# Patient Record
Sex: MALE | Race: WHITE | NOT HISPANIC OR LATINO | Employment: OTHER | URBAN - METROPOLITAN AREA
[De-identification: names, ages, dates, MRNs, and addresses within clinical notes are randomized per-mention and may not be internally consistent; named-entity substitution may affect disease eponyms.]

---

## 2017-11-21 ENCOUNTER — HISTORICAL (OUTPATIENT)
Dept: RADIOLOGY | Facility: HOSPITAL | Age: 82
End: 2017-11-21

## 2017-12-18 ENCOUNTER — HISTORICAL (OUTPATIENT)
Dept: RADIOLOGY | Facility: HOSPITAL | Age: 82
End: 2017-12-18

## 2019-07-25 ENCOUNTER — HISTORICAL (OUTPATIENT)
Dept: RADIOLOGY | Facility: HOSPITAL | Age: 84
End: 2019-07-25

## 2020-02-07 ENCOUNTER — HISTORICAL (OUTPATIENT)
Dept: RADIOLOGY | Facility: HOSPITAL | Age: 85
End: 2020-02-07

## 2020-10-13 ENCOUNTER — HISTORICAL (OUTPATIENT)
Dept: RESPIRATORY THERAPY | Facility: HOSPITAL | Age: 85
End: 2020-10-13

## 2020-11-02 ENCOUNTER — HISTORICAL (OUTPATIENT)
Dept: NUTRITION | Facility: HOSPITAL | Age: 85
End: 2020-11-02

## 2020-11-03 LAB
ABS NEUT (OLG): 7.19 X10(3)/MCL (ref 2.1–9.2)
ALBUMIN SERPL-MCNC: 3.2 GM/DL (ref 3.4–5)
ALBUMIN/GLOB SERPL: 0.9 RATIO (ref 1.1–2)
ALP SERPL-CCNC: 71 UNIT/L (ref 40–150)
ALT SERPL-CCNC: 27 UNIT/L (ref 0–55)
AST SERPL-CCNC: 24 UNIT/L (ref 5–34)
BASOPHILS # BLD AUTO: 0.1 X10(3)/MCL (ref 0–0.2)
BASOPHILS NFR BLD AUTO: 1 %
BILIRUB SERPL-MCNC: 0.5 MG/DL
BILIRUBIN DIRECT+TOT PNL SERPL-MCNC: 0.2 MG/DL (ref 0–0.5)
BILIRUBIN DIRECT+TOT PNL SERPL-MCNC: 0.3 MG/DL (ref 0–0.8)
BUN SERPL-MCNC: 24.6 MG/DL (ref 8.4–25.7)
CALCIUM SERPL-MCNC: 8.5 MG/DL (ref 8.8–10)
CHLORIDE SERPL-SCNC: 101 MMOL/L (ref 98–111)
CO2 SERPL-SCNC: 27 MMOL/L (ref 23–31)
CREAT SERPL-MCNC: 1.05 MG/DL (ref 0.73–1.18)
EOSINOPHIL # BLD AUTO: 0.2 X10(3)/MCL (ref 0–0.9)
EOSINOPHIL NFR BLD AUTO: 2 %
ERYTHROCYTE [DISTWIDTH] IN BLOOD BY AUTOMATED COUNT: 14.3 % (ref 11.5–17)
GLOBULIN SER-MCNC: 3.7 GM/DL (ref 2.4–3.5)
GLUCOSE SERPL-MCNC: 152 MG/DL (ref 75–121)
HCT VFR BLD AUTO: 35.1 % (ref 42–52)
HGB BLD-MCNC: 10.7 GM/DL (ref 14–18)
LYMPHOCYTES # BLD AUTO: 1.1 X10(3)/MCL (ref 0.6–4.6)
LYMPHOCYTES NFR BLD AUTO: 12 %
MCH RBC QN AUTO: 30.1 PG (ref 27–31)
MCHC RBC AUTO-ENTMCNC: 30.5 GM/DL (ref 33–36)
MCV RBC AUTO: 98.9 FL (ref 80–94)
MONOCYTES # BLD AUTO: 0.8 X10(3)/MCL (ref 0.1–1.3)
MONOCYTES NFR BLD AUTO: 9 %
NEUTROPHILS # BLD AUTO: 7.19 X10(3)/MCL (ref 2.1–9.2)
NEUTROPHILS NFR BLD AUTO: 76 %
PLATELET # BLD AUTO: 162 X10(3)/MCL (ref 130–400)
PMV BLD AUTO: 11.1 FL (ref 9.4–12.4)
POTASSIUM SERPL-SCNC: 4.5 MMOL/L (ref 3.5–5.1)
PROT SERPL-MCNC: 6.9 GM/DL (ref 5.8–7.6)
RBC # BLD AUTO: 3.55 X10(6)/MCL (ref 4.7–6.1)
SODIUM SERPL-SCNC: 140 MMOL/L (ref 132–146)
WBC # SPEC AUTO: 9.4 X10(3)/MCL (ref 4.5–11.5)

## 2020-11-26 LAB — EST CREAT CLEARANCE SER (OHS): 22.04 ML/MIN

## 2021-06-11 ENCOUNTER — HISTORICAL (OUTPATIENT)
Dept: RADIOLOGY | Facility: HOSPITAL | Age: 86
End: 2021-06-11

## 2021-12-20 ENCOUNTER — HISTORICAL (OUTPATIENT)
Dept: RADIOLOGY | Facility: HOSPITAL | Age: 86
End: 2021-12-20

## 2022-04-10 ENCOUNTER — HISTORICAL (OUTPATIENT)
Dept: ADMINISTRATIVE | Facility: HOSPITAL | Age: 87
End: 2022-04-10

## 2022-04-29 VITALS
DIASTOLIC BLOOD PRESSURE: 62 MMHG | SYSTOLIC BLOOD PRESSURE: 122 MMHG | HEIGHT: 65 IN | WEIGHT: 216.5 LBS | BODY MASS INDEX: 36.07 KG/M2

## 2023-01-17 ENCOUNTER — LAB VISIT (OUTPATIENT)
Dept: LAB | Facility: HOSPITAL | Age: 88
End: 2023-01-17
Attending: INTERNAL MEDICINE
Payer: MEDICARE

## 2023-01-17 DIAGNOSIS — R05.9 COUGH, UNSPECIFIED TYPE: ICD-10-CM

## 2023-01-17 DIAGNOSIS — R50.9 FEVER, UNSPECIFIED FEVER CAUSE: Primary | ICD-10-CM

## 2023-01-17 DIAGNOSIS — R09.81 NASAL CONGESTION: ICD-10-CM

## 2023-01-17 LAB
FLUAV AG UPPER RESP QL IA.RAPID: NOT DETECTED
FLUBV AG UPPER RESP QL IA.RAPID: NOT DETECTED
RSV A 5' UTR RNA NPH QL NAA+PROBE: NOT DETECTED
SARS-COV-2 RNA RESP QL NAA+PROBE: DETECTED

## 2023-01-17 PROCEDURE — 0241U COVID/RSV/FLU A&B PCR: CPT

## 2024-06-14 ENCOUNTER — HOSPITAL ENCOUNTER (OUTPATIENT)
Dept: RADIOLOGY | Facility: HOSPITAL | Age: 89
Discharge: HOME | End: 2024-06-14
Payer: MEDICARE

## 2024-06-14 DIAGNOSIS — R05.9 COUGH, UNSPECIFIED: ICD-10-CM

## 2024-06-14 PROCEDURE — 71046 X-RAY EXAM CHEST 2 VIEWS: CPT | Mod: TC

## 2025-04-22 ENCOUNTER — HOSPITAL ENCOUNTER (EMERGENCY)
Facility: HOSPITAL | Age: OVER 89
Discharge: HOME | End: 2025-04-22
Attending: EMERGENCY MEDICINE
Payer: MEDICARE

## 2025-04-22 VITALS
WEIGHT: 198 LBS | BODY MASS INDEX: 32.99 KG/M2 | TEMPERATURE: 98 F | HEIGHT: 65 IN | HEART RATE: 71 BPM | OXYGEN SATURATION: 98 % | RESPIRATION RATE: 16 BRPM | SYSTOLIC BLOOD PRESSURE: 148 MMHG | DIASTOLIC BLOOD PRESSURE: 69 MMHG

## 2025-04-22 DIAGNOSIS — S09.90XA INJURY OF HEAD, INITIAL ENCOUNTER: ICD-10-CM

## 2025-04-22 DIAGNOSIS — S01.01XA LACERATION OF SCALP, INITIAL ENCOUNTER: ICD-10-CM
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
## 2025-04-22 PROCEDURE — 25000003 PHARM REV CODE 250: Performed by: NURSE PRACTITIONER

## 2025-04-22 PROCEDURE — 99284 EMERGENCY DEPT VISIT MOD MDM: CPT | Mod: 25

## 2025-04-22 PROCEDURE — 12002 RPR S/N/AX/GEN/TRNK2.6-7.5CM: CPT

## 2025-04-22 RX ORDER — LIDOCAINE AND PRILOCAINE 25; 25 MG/G; MG/G
CREAM TOPICAL
Status: COMPLETED | OUTPATIENT
Start: 2025-04-22 | End: 2025-04-22

## 2025-04-22 RX ADMIN — LIDOCAINE AND PRILOCAINE: 25; 25 CREAM TOPICAL at 08:04

## 2025-04-23 NOTE — DISCHARGE INSTRUCTIONS
Clean with soap and water. Tylenol for pain if needed. Return to ER in 5-7 days to have staples removed.

## 2025-04-23 NOTE — ED PROVIDER NOTES
Encounter Date: 4/22/2025       History     Chief Complaint   Patient presents with    Fall     Pt states he fell out of the shower and hit his head on the floor. Laceration noted to center forehead with bleeding controlled. Denies pain, blood thinners, or LOC. GCS 15      See MDM    The history is provided by the patient. No  was used.     Review of patient's allergies indicates:   Allergen Reactions    Plavix [clopidogrel]      Past Medical History:   Diagnosis Date    Diabetes mellitus      Past Surgical History:   Procedure Laterality Date    INSERTION OF PACEMAKER       No family history on file.  Social History[1]  Review of Systems   Skin:  Positive for wound.   All other systems reviewed and are negative.      Physical Exam     Initial Vitals [04/22/25 2030]   BP Pulse Resp Temp SpO2   (!) 151/98 73 18 98 °F (36.7 °C) 96 %      MAP       --         Physical Exam    Nursing note and vitals reviewed.  Constitutional: He appears well-developed and well-nourished.   HENT:   Head: Head is with laceration.       3cm laceration to top of head near forehead    Cardiovascular:  Normal rate.           Pulmonary/Chest: No respiratory distress.     Neurological: He is alert.   Skin: Skin is warm and dry.   Psychiatric: He has a normal mood and affect.         ED Course   Lac Repair    Date/Time: 4/22/2025 9:26 PM    Performed by: Renea Madison FNP  Authorized by: Lazaro Foster DO    Consent:     Consent obtained:  Verbal    Consent given by:  Patient    Risks discussed:  Need for additional repair and infection    Alternatives discussed:  No treatment  Universal protocol:     Procedure explained and questions answered to patient or proxy's satisfaction: yes      Patient identity confirmed:  Verbally with patient  Anesthesia:     Anesthesia method:  Topical application    Topical anesthetic:  EMLA cream  Laceration details:     Location:  Scalp    Scalp location:  Frontal    Length (cm):   3  Exploration:     Contaminated: no    Treatment:     Area cleansed with:  Saline    Amount of cleaning:  Standard  Skin repair:     Repair method:  Staples    Number of staples:  4  Approximation:     Approximation:  Close  Repair type:     Repair type:  Simple  Post-procedure details:     Dressing:  Open (no dressing)    Procedure completion:  Tolerated well, no immediate complications    Labs Reviewed - No data to display       Imaging Results              CT Cervical Spine Without Contrast (Preliminary result)  Result time 04/22/25 21:11:54      Preliminary result by Nathan Douglas Jr., MD (04/22/25 21:11:54)                   Narrative:    START OF REPORT:  Technique: CT of the cervical spine was performed without intravenous contrast with axial as well as sagittal and coronal images.    Comparison: Comparison is with study dated 2021-01-03 12:48:31.    Dosage Information: Automated Exposure Control was utilized 1261.25 mGy.cm.    Clinical history: Fall.    Findings:  Lung apices: The visualized lung apices appear unremarkable.  Spine:  Spinal canal: The spinal canal appears unremarkable.  Mineralization: Moderate osteopenia is seen in the visualized bony structures.  Rotation: No significant rotation is seen.  Scoliosis: No significant scoliosis is seen.  Vertebral Fusion: Chronic appearing bony fusion is again seen at C6-C7.  Listhesis: There is stable grade I anterolisthesis C4 on C5 and C5 on C6.  Lordosis: Stable reversal of the normal cervical lordosis is again seen. The reversal is centered on C6.  Intervertebral disc spaces: Stable multilevel loss of disc height is seen.  Osteophytes: Stable moderate to severe multilevel endplate osteophytes are seen.  Endplate Sclerosis: Stable moderate multilevel endplate sclerosis is seen.  Uncovertebral degenerative changes: Stable moderate multilevel uncovertebral joint arthrosis is seen.  Facet degenerative changes: Stable mild multilevel facet  degenerative changes are seen.  Fractures: No acute cervical spine fracture dislocation or subluxation is seen.  Orthopedic Hardware: Posterior decompression is again seen at C3 through C6.    Miscellaneous:  Mastoid air cells: The visualized mastoid air cells appear clear.      Impression:  1. No acute cervical spine fracture dislocation or subluxation is seen.  2. Degenerative changes and other details as above.                                         CT Head Without Contrast (Preliminary result)  Result time 04/22/25 21:10:47      Preliminary result by Nathan Douglas Jr., MD (04/22/25 21:10:47)                   Narrative:    START OF REPORT:  Technique: CT of the head was performed without intravenous contrast with axial as well as coronal and sagittal images.    Comparison: Comparison is with study dated 2021-01-03 12:52:10.    Dosage Information: Automated exposure control was utilized.    Clinical history: Fall.    Findings:  Hemorrhage: No acute intracranial hemorrhage is seen.  CSF spaces: The ventricles, sulci and basal cisterns all appear moderately prominent consistent with global cerebral atrophy. The degree of cerebral atrophy is stable compared to the prior study.  Cerebellum: Unremarkable.  Vascular: Stable appearing atheromatous calcification of the intracranial arteries is seen.  Sella and skull base: The sella appears to be within normal limits for age.  Intracranial calcifications: Incidental note is made of bilateral choroid plexus calcification. Incidental note is made of some pineal region calcification. Incidental note is made of some calcification of the falx. Incidental note is made of minimal bilateral basal ganglia calcifcation.  Calvarium: No acute linear or depressed skull fracture is seen.  Scalp: Minimal scalp soft tissue swelling is seen along the to the left of midline frontal bone. This consistent with a scalp contusion. No underlying bone injury is identified. No associated  foreign bodies are seen in the contusion.    Maxillofacial Structures:  Paranasal sinuses: The visualized paranasal sinuses appear clear with no significant mucoperiosteal thickening or air fluid levels identified.  Orbits: The orbits appear unremarkable.  Zygomatic arches: The zygomatic arches are intact and unremarkable.  Temporal bones and mastoids: Sclerosis of some of the bilateral mastoid air cells are again noted, stable from the prior study.  TMJ: The mandibular condyles appear normally placed with respect to the mandibular fossa.  Nasal Bones: No displaced nasal bone fracture is seen.    Visualized upper cervical spine: The visualized cervical spine appears unremarkable.      Impression:  1. No acute intracranial traumatic injury identified. Details and other findings as noted above.                                         Medications   LIDOcaine-prilocaine cream ( Topical (Top) Given 4/22/25 2051)     Medical Decision Making  89 y/o male presents with bending over in shower to wash his feet when he continued bending and fell. Hit head on shower floor. Denies loc. No thinners. No n/v. Has laceration to forehead.     Lac repaired with 4 staples.       Amount and/or Complexity of Data Reviewed  Radiology: ordered.     Details: No acute findings per preliminary read     Risk  Prescription drug management.      Additional MDM:   Differential Diagnosis:   Other: The following diagnoses were also considered and will be evaluated: head laceration, head bleed and cervical fracture.                                   Clinical Impression:  Final diagnoses:  [W19.XXXA] Fall, initial encounter (Primary)  [S09.90XA] Injury of head, initial encounter  [S01.01XA] Laceration of scalp, initial encounter          ED Disposition Condition    Discharge Stable          ED Prescriptions    None       Follow-up Information       Follow up With Specialties Details Why Contact Info    Fernando Carter MD Internal Medicine Call    [address] [facility], [facility] [address]  [phone]                   [1]   Social History  Tobacco Use    Smoking status: Never    Smokeless tobacco: Never        Renea Madison, BRANDON  04/22/25 2129

## 2025-04-29 ENCOUNTER — HOSPITAL ENCOUNTER (EMERGENCY)
Facility: HOSPITAL | Age: OVER 89
Discharge: HOME | End: 2025-04-29
Attending: INTERNAL MEDICINE
Payer: MEDICARE

## 2025-04-29 VITALS
OXYGEN SATURATION: 95 % | RESPIRATION RATE: 18 BRPM | HEIGHT: 65 IN | TEMPERATURE: 98 F | SYSTOLIC BLOOD PRESSURE: 127 MMHG | WEIGHT: 198 LBS | HEART RATE: 77 BPM | DIASTOLIC BLOOD PRESSURE: 72 MMHG | BODY MASS INDEX: 32.99 KG/M2

## 2025-04-29 DIAGNOSIS — Z48.02 ENCOUNTER FOR STAPLE REMOVAL: Primary | ICD-10-CM

## 2025-04-29 PROCEDURE — 99999 HC NO LEVEL OF SERVICE - ED ONLY: CPT

## 2025-04-29 NOTE — ED PROVIDER NOTES
Encounter Date: 4/29/2025  History from patient     History     Chief Complaint   Patient presents with    Suture / Staple Removal     Needs suture removed from scalp, placed 4/22/25     HPI    Raymond L Abadie is 89 y.o. male who  has a past medical history of Diabetes mellitus. arrives in ER with c/o Suture / Staple Removal (Needs suture removed from scalp, placed 4/22/25)    Review of patient's allergies indicates:   Allergen Reactions    Plavix [clopidogrel]      Past Medical History:   Diagnosis Date    Diabetes mellitus      Past Surgical History:   Procedure Laterality Date    INSERTION OF PACEMAKER       No family history on file.  Social History[1]  Review of Systems  Denies any complaints  Physical Exam     Initial Vitals [04/29/25 0851]   BP Pulse Resp Temp SpO2   127/72 77 18 98.1 °F (36.7 °C) 95 %      MAP       --         Physical Exam  Four staples in the anterior part of the scalp in the midline  ED Course   Procedures  Labs Reviewed - No data to display       Imaging Results    None          Medications - No data to display  Medical Decision Making    Raymond L Abadie is 89 y.o. male who  has a past medical history of Diabetes mellitus. arrives in ER with c/o Suture / Staple Removal (Needs suture removed from scalp, placed 4/22/25)  I removed for staples, cleaned it with a call prep and will let him go home.                                  Clinical Impression:  Final diagnoses:  [Z48.02] Encounter for staple removal (Primary)          ED Disposition Condition    Discharge Stable          ED Prescriptions    None       Follow-up Information       Follow up With Specialties Details Why Contact Info    Fernando Carter MD Internal Medicine In 2 days  [facility] [facility]  [address]  [place], [place] [address]  [phone]                 [1]   Social History  Tobacco Use    Smoking status: Never    Smokeless tobacco: Never        Gabriel Gaming MD  04/29/25 9906